# Patient Record
Sex: MALE | Race: WHITE | NOT HISPANIC OR LATINO | URBAN - METROPOLITAN AREA
[De-identification: names, ages, dates, MRNs, and addresses within clinical notes are randomized per-mention and may not be internally consistent; named-entity substitution may affect disease eponyms.]

---

## 2023-11-24 ENCOUNTER — LAB (OUTPATIENT)
Dept: LAB | Facility: LAB | Age: 53
End: 2023-11-24
Payer: COMMERCIAL

## 2023-11-24 DIAGNOSIS — Z12.5 ENCOUNTER FOR SCREENING FOR MALIGNANT NEOPLASM OF PROSTATE: Primary | ICD-10-CM

## 2023-11-24 LAB — PSA SERPL-MCNC: 0.38 NG/ML

## 2023-11-24 PROCEDURE — 36415 COLL VENOUS BLD VENIPUNCTURE: CPT

## 2023-11-24 PROCEDURE — 84153 ASSAY OF PSA TOTAL: CPT

## 2024-01-18 ENCOUNTER — LAB (OUTPATIENT)
Dept: LAB | Facility: LAB | Age: 54
End: 2024-01-18
Payer: COMMERCIAL

## 2024-04-10 ENCOUNTER — TELEPHONE (OUTPATIENT)
Dept: UROLOGY | Facility: CLINIC | Age: 54
End: 2024-04-10
Payer: COMMERCIAL

## 2024-04-10 DIAGNOSIS — N40.0 BENIGN PROSTATIC HYPERPLASIA, UNSPECIFIED WHETHER LOWER URINARY TRACT SYMPTOMS PRESENT: Primary | ICD-10-CM

## 2024-04-10 RX ORDER — TAMSULOSIN HYDROCHLORIDE 0.4 MG/1
0.4 CAPSULE ORAL NIGHTLY
Qty: 30 CAPSULE | Refills: 6 | Status: SHIPPED | OUTPATIENT
Start: 2024-04-10 | End: 2024-11-06

## 2024-04-10 NOTE — TELEPHONE ENCOUNTER
Patient asking for refill on his Tamsulosin to be sent to [facility] in [place].    Last time in was 9/22  Thank you

## 2024-08-07 ENCOUNTER — TELEPHONE (OUTPATIENT)
Dept: UROLOGY | Facility: CLINIC | Age: 54
End: 2024-08-07
Payer: COMMERCIAL

## 2024-08-07 DIAGNOSIS — N52.8 OTHER MALE ERECTILE DYSFUNCTION: Primary | ICD-10-CM

## 2024-08-07 RX ORDER — SILDENAFIL 50 MG/1
50 TABLET, FILM COATED ORAL DAILY PRN
Qty: 10 TABLET | Refills: 3 | Status: SHIPPED | OUTPATIENT
Start: 2024-08-07 | End: 2024-08-08 | Stop reason: SDUPTHER

## 2024-08-07 NOTE — TELEPHONE ENCOUNTER
Pt calling needing a refill sildenafil 50 mg to [facility] in [place]. Thank you! (Pt is aware it will be in after 6pm tonight)

## 2024-08-08 RX ORDER — SILDENAFIL 50 MG/1
50 TABLET, FILM COATED ORAL DAILY PRN
Qty: 10 TABLET | Refills: 3 | Status: SHIPPED | OUTPATIENT
Start: 2024-08-08 | End: 2024-09-17

## 2024-09-26 ENCOUNTER — APPOINTMENT (OUTPATIENT)
Dept: UROLOGY | Facility: CLINIC | Age: 54
End: 2024-09-26
Payer: COMMERCIAL

## 2024-10-14 ENCOUNTER — APPOINTMENT (OUTPATIENT)
Dept: UROLOGY | Facility: CLINIC | Age: 54
End: 2024-10-14
Payer: COMMERCIAL

## 2024-10-14 VITALS
BODY MASS INDEX: 31.17 KG/M2 | SYSTOLIC BLOOD PRESSURE: 152 MMHG | HEART RATE: 64 BPM | WEIGHT: 256 LBS | DIASTOLIC BLOOD PRESSURE: 87 MMHG | HEIGHT: 76 IN

## 2024-10-14 DIAGNOSIS — Z12.5 ENCOUNTER FOR SCREENING PROSTATE SPECIFIC ANTIGEN (PSA) MEASUREMENT: ICD-10-CM

## 2024-10-14 DIAGNOSIS — N40.1 BENIGN PROSTATIC HYPERPLASIA WITH LOWER URINARY TRACT SYMPTOMS, SYMPTOM DETAILS UNSPECIFIED: ICD-10-CM

## 2024-10-14 DIAGNOSIS — N52.8 OTHER MALE ERECTILE DYSFUNCTION: Primary | ICD-10-CM

## 2024-10-14 PROCEDURE — 99213 OFFICE O/P EST LOW 20 MIN: CPT | Performed by: UROLOGY

## 2024-10-14 PROCEDURE — 3008F BODY MASS INDEX DOCD: CPT | Performed by: UROLOGY

## 2024-10-14 NOTE — PROGRESS NOTES
10/14/2024  Voiding well on Flomax 0.4 mg daily    Patient has no nausea, no vomiting, no fever.    CARTER: Deferred    PSA:.  Pending    We discussed benign prostate hypertrophy with a mild voiding symptoms on Flomax  We discussed erectile dysfunction on Viagra 50 mg as needed, will call if wants to try a different medication  We discussed the PSA screening, pending  All the questions were answered, the patient expressed understanding and agreed to the plan.     Impression  BPH  ED  Family history of prostate cancer     Plan  Continue Viagra 50 mg twice a week as needed  Continue Flomax 0.4 mg daily  Yearly CARTER and PSA    Chief Complaint   Patient presents with    Follow-up     Pt here for his yearly check up since he is over 48 Pt was seeing the Dr ramirez· BPH (benign prostatic hyperplasiar         Physical Exam     TODAYS LAB RESULTS:      ASSESSMENT&PLAN:      IMPRESSIONS:    09/25/2023  Voiding same on Flomax and sildenafil     Patient has no nausea, no vomiting, no fever.     CARTER: Deep prostate, no nodule     PSA: Pending     Patient here for BPH and ED, last PSA done 10/19/2022 0.52  Patient was to have PSA done for this follow up appointment. Patient taking flomax and slaldenafil. Patient needs refill on sildenafil. He has not gotten PSA done this year. He states that he has no urinary problems at all. --Parvin LYLES     We discussed benign prostate hypertrophy with a mild voiding symptoms on Flomax  We discussed erectile dysfunction on Viagra 50 mg as needed, will call if wants to try a different medication  We discussed the PSA screening, normal  All the questions were answered, the patient expressed understanding and agreed to the plan.     Impression  BPH  ED  Family history of prostate cancer     Plan  Continue Viagra 50 mg twice a week as needed  Continue Flomax 0.4 mg daily  PSA in 2 weeks, then in a year  Appointment in 1 year        09/19/2022  Voiding well, no complaints     Patient has no nausea, no  vomiting, no fever.     CARTER: 1+ no nodule     Normal PSA 0.36     Patient here today for year check of BPH, ED, family history of prostate cancer. Patient was to continue Flomax 0.4mg daily and Viagra 50mg PRN.   PSA 9/27/21 0.36  due at the end of this month for next screening PSA  Patient denies dysuria, burning, hematuria. Nocturia x1-2 depending on liquid intake. Overall states he is doing well.      -JMorgenstern CMA  IO Glucose - Urine Negative REQUIRED    IO Bilirubin Negative REQUIRED    IO Ketones Negative REQUIRED    IO Specific Gravity 1.020 REQUIRED    IO Blood Negative REQUIRED    IO pH 5.5 REQUIRED    IO Protein, Urine Negative REQUIRED    IO Urobilinogen Normal (0.2-1.0 mg/dl) REQUIRED    IO Nitrite, Urine Negative REQUIRED    IO Leukocytes Negative      We discussed benign prostate hypertrophy with a mild voiding symptoms on Flomax  We discussed erectile dysfunction on Viagra 50 mg as needed, will call if wants to try a different medication  We discussed the PSA screening, normal  All the questions were answered, the patient expressed understanding and agreed to the plan.     Impression  BPH  ED  Family history of prostate cancer     Plan  Continue Viagra 50 mg twice a week as needed  Continue Flomax 0.4 mg daily  PSA in 2 weeks, then in a year  Appointment in 1 year              9/16/21:   Patient here today for a yearly check up for BPH and ED.     Patient is taking 0.4mg Flomax and Viagra 50mg as needed, he does seem think it is working.     Patient has no nausea, vomiting, or fever      CARTER: 1+, no nodule     PSA 9/10/21 0.54     We discussed benign prostate hypertrophy with a mild voiding symptoms on Flomax  We discussed erectile dysfunction on Viagra 50 mg as needed, will call if wants to try a different medication  We discussed the PSA screening, normal  All the questions were answered, the patient expressed understanding and agreed to the plan.     Impression  BPH  ED  Family history of  prostate cancer     Plan  Viagra 50 mg twice a week as needed Ã--10   Will call if want different ED medication  Continue Flomax 0.4 mg daily  yearly CARTER and a PSA            09/17/2020  Voiding well on Flomax 0.4 mg daily, nocturia Ã--1. Occasional use Viagra 50 mg which working     Patient has no nausea, no vomiting, no fever.     CARTER: Deferred     PSA 9/10/20 0.54     We discussed benign prostate hypertrophy with a mild voiding symptoms on Flomax  We discussed erectile dysfunction may try Cialis in the future,  We discussed the PSA screening, normal  All the questions were answered, the patient expressed understanding and agreed to the plan.     Impression  BPH  ED  Family history of prostate cancer     Plan  Continue Flomax 0.4 mg daily  Consider Cialis in the future  yearly CARTER and a PSA         09/06/2019  Voiding well, complaining ED     Patient has no nausea, no vomiting, no fever.     PSA normal     CARTER: 1+, no nodule     IO UA (automated w/o microscopy)[id number] 03:01PMConrado Sweeney NameResultFlagReference  IO Glucose - Urine         Negative                Normal  IO Bilirubin       (+)small                 Negative  IO Ketones      Trace                     Negative  IO Specific Gravity         1.025                     1.000-1.030  IO Blood          Negative                Negative  IO pH               5.5                         5.0-8.0  IO Protein, Urine            Trace                     Negative  IO Urobilinogen                                            Normal  Normal (0.2-1.0 mg/dl)  IO Nitrite, Urine              Negative                Negative  IO Leukocytes Negative                Negative  IO Glucose - Urine         Negative                Normal  IO Bilirubin       (+)small                 Negative  IO Ketones      Trace                     Negative  IO Specific Gravity         1.025                     1.000-1.030  IO Blood          Negative                Negative  IO pH                5.5                         5.0-8.0  IO Protein, Urine            Trace                     Negative  IO Urobilinogen                                            Normal  Normal (0.2-1.0 mg/dl)  IO Nitrite, Urine              Negative                Negative  IO Leukocytes Negative                Negative                                            We discussed benign prostate hypertrophy with mild voiding symptoms  We discussed history of prostate cancer family, yearly PSA and CARTER   We discussed erectile dysfunction Viagra trial 50 mg Ã--4, risk and benefit and side effects alternative etc.  All the questions were answered, the patient expressed understanding and agreed to the plan.     Impression  BPH  ED  Family history of prostate cancer     Plan  Viagra 50 mg twice a week as needed Ã--4  Call for refill  Yearly CARTER and a PSA      I spent 25 minutes with this patient. Greater than 50% of this time was spent in counseling and/or coordination of care           6/25/18  Did not tried Viagra due to the cost. Erection is okay now.     Patient has no nausea, no vomiting, no fever.     Uncle has prostate cancer     We discussed erectile dysfunction, Viagra use; we discussed family history of prostate cancer, early PSA screening. Patient agreed.All the questions were answered, the patient expressed understanding and agreed to the plan.     Impression  ED  Family history of prostate cancer     Plan  PSA now  Yearly ED follow-up  Yearly CARTER and PSA        04/02/2018  47-year-old white male complaining over 2 years difficulty erection: Also complained slow stream, family history of prostate cancer     Patient has no nausea, no vomiting, no fever.     Family history of prostate cancer     Exam: Normal penis, normal testes,      CARTER: 1+, no nodule     IO UA (automated w/o microscopy)[id number] 02:32PConrado Morse     Test NameResultFlagReference  IO Glucose - Urine         Negative                Normal  IO Bilirubin        Negative                Negative  IO Ketones      Negative                Negative  IO Specific Gravity         1.030                     1.000-1.030  IO Blood          Negative                Negative  IO pH               5.5                         5.0-8.0  IO Protein, Urine            Negative                Negative  IO Urobilinogen                                            Normal  Normal (0.2-1.0 mg/dl)  IO Nitrite, Urine              Negative                Negative  IO Leukocytes Negative                Negative  IO Glucose - Urine         Negative                Normal  IO Bilirubin       Negative                Negative  IO Ketones      Negative                Negative  IO Specific Gravity         1.030                     1.000-1.030  IO Blood          Negative                Negative  IO pH               5.5                         5.0-8.0  IO Protein, Urine            Negative                Negative  IO Urobilinogen                                            Normal  Normal (0.2-1.0 mg/dl)  IO Nitrite, Urine              Negative                Negative  IO Leukocytes Negative                Negative                                            We discussed benign erectile dysfunction, possible stress-related; Viagra trial, prostate hypertrophy, mild voiding symptoms; family history of prostate cancer and early PSA screening etc.All the questions were answered, the patient expressed understanding and agreed to the plan.     Impression  Erectile dysfunction  BPH  PSA screening     Plan  Viagra 50 mg twice a week as needed Ã--10 refill Ã--1  Conservative management for BPH  Continued PSA screening yearly due to family history  Appointment in 2 months        PSA  11/24/2023 0.38  10/19/2022 0.52  9/27/21 0.36  9/10/21 0.54  9/10/20 0.54  5/23/19 0.44.  3/30/18 0.43.

## 2025-10-16 ENCOUNTER — APPOINTMENT (OUTPATIENT)
Dept: UROLOGY | Facility: CLINIC | Age: 55
End: 2025-10-16
Payer: COMMERCIAL